# Patient Record
Sex: FEMALE | Race: WHITE | NOT HISPANIC OR LATINO | Employment: FULL TIME | URBAN - METROPOLITAN AREA
[De-identification: names, ages, dates, MRNs, and addresses within clinical notes are randomized per-mention and may not be internally consistent; named-entity substitution may affect disease eponyms.]

---

## 2023-06-19 LAB — ANTE RHIG: NORMAL

## 2023-07-03 ENCOUNTER — CLINICAL SUPPORT (OUTPATIENT)
Dept: INTERNAL MEDICINE | Facility: CLINIC | Age: 33
End: 2023-07-03
Payer: MEDICAID

## 2023-07-03 DIAGNOSIS — Z71.3 DIETARY COUNSELING: Primary | ICD-10-CM

## 2023-07-03 DIAGNOSIS — O24.419 GESTATIONAL DIABETES MELLITUS (GDM) IN THIRD TRIMESTER, GESTATIONAL DIABETES METHOD OF CONTROL UNSPECIFIED: ICD-10-CM

## 2023-07-03 PROCEDURE — 97802 PR MED NUTR THER, 1ST, INDIV, EA 15 MIN: ICD-10-PCS | Mod: 95,,, | Performed by: DIETITIAN, REGISTERED

## 2023-07-03 PROCEDURE — 97802 MEDICAL NUTRITION INDIV IN: CPT | Mod: 95,,, | Performed by: DIETITIAN, REGISTERED

## 2023-07-03 NOTE — PROGRESS NOTES
"The patient location is: [place]  The chief complaint leading to consultation is: nutrition referral    Visit type: audiovisual    Face to Face time with patient: 23 minutes  28 minutes of total time spent on the encounter, which includes face to face time and non-face to face time preparing to see the patient (eg, review of tests), Obtaining and/or reviewing separately obtained history, Documenting clinical information in the electronic or other health record, Independently interpreting results (not separately reported) and communicating results to the patient/family/caregiver, or Care coordination (not separately reported).         Each patient to whom he or she provides medical services by telemedicine is:  (1) informed of the relationship between the physician and patient and the respective role of any other health care provider with respect to management of the patient; and (2) notified that he or she may decline to receive medical services by telemedicine and may withdraw from such care at any time.    Notes:   Nutrition Assessment      Client name:  Falguni Hankins  :  1990  Age:  32 y.o.  Gender:  female    Client states:  referred by Latesha Chase MD with a  diagnosis of:   -Gestational diabetes mellitus (GDM) in third trimester, gestational diabetes method of control unspecified    Currently 32 weeks pregnant with 3rd child (boy). Already have 1 boy and 1 girl.  No prior gestational diabetes diagnosis.     Since diagnosis patient has stopped drinking sodas.    Recent fasting glucose levels: 92, 95, 94  Recent post meal glucose levels: 119, 102, 114. Reports having one reading in the 130s.    Since diagnosis: stopped drinking soda      Anthropometrics  Height:  65"     Weight:  2023: 255 lbs  BMI:  42.43  % Body Fat:  n/a    Clinical Signs/Symptoms  N/V/D:  none noted  Appetite:  good       Past Medical History:   Diagnosis Date    Depression     Hypertension        Past Surgical " History:   Procedure Laterality Date    APPENDECTOMY       SECTION  2012     SECTION  2011       Medications    has a current medication list which includes the following prescription(s): blood sugar diagnostic, blood-glucose meter, bupropion, doxylamine-pyridoxine (vit b6), lancets, nifedipine, ondansetron, promethazine, and sertraline.    Vitamins, Minerals, and/or Supplements:  not discussed     Food/Medication Interactions:  Reviewed     Food Allergies or Intolerances:  NKFA noted in chart     Social History    Marital status:    Employment:  yes    Social History     Tobacco Use    Smoking status: Every Day     Types: Vaping with nicotine    Smokeless tobacco: Never   Substance Use Topics    Alcohol use: Never        Lab Reports   Sodium   Date Value Ref Range Status   2023 136 136 - 145 mmol/L Final     Potassium   Date Value Ref Range Status   2023 3.3 (L) 3.5 - 5.1 mmol/L Final     Chloride   Date Value Ref Range Status   2023 104 95 - 110 mmol/L Final     CO2   Date Value Ref Range Status   2023 23 23 - 29 mmol/L Final     Glucose   Date Value Ref Range Status   2023 113 (H) 70 - 110 mg/dL Final     BUN   Date Value Ref Range Status   2023 7 6 - 20 mg/dL Final     Creatinine   Date Value Ref Range Status   2023 0.6 0.5 - 1.4 mg/dL Final     Calcium   Date Value Ref Range Status   2023 9.3 8.7 - 10.5 mg/dL Final     Anion Gap   Date Value Ref Range Status   2023 9 8 - 16 mmol/L Final      Lab Results   Component Value Date    WBC 9.88 06/15/2023    HGB 12.0 06/15/2023    HCT 36.6 (A) 06/15/2023    MCV 86.5 06/15/2023     06/15/2023        No results found for: CHOL  No results found for: HDL  No results found for: LDLCALC  No results found for: TRIG  No results found for: CHOLHDL  No results found for: LABA1C, HGBA1C  BP Readings from Last 1 Encounters:   23 132/76       Food History  Provided above    Exercise  History:  not discussed    Cultural/Spiritual/Personal Preferences:  None identified    Support System:  family/friends    State of Change:  Preparation    Barriers to Change:  none    Diagnosis    Food and nutrition related knowledge deficit related to no prior nutrition education as evidenced by seeking medical nutrition therapy for gestational diabetes.    Intervention    RMR (Method:  [place] [facility]):  1876 kcal  Activity Factor:  1.3    DEXTER:  2438 kcal    Goals:  1.  When consuming carbohydrates, choose high fiber sources and always pair with a source of protein  2.  Continue to consume sugar free beverages      Nutrition Education  The following education was provided to the patient:  Discussed meal planning/[employer] My Plate design.  Discussed label reading.  Suggested dietary modifications based on current dietary behaviors and individual food preferences.  Discussed gestational diabetes nutrition therapy    Patient verbalized understanding of nutrition education and recommendations received.    Handouts Provided, emailed  Balanced Diabetes Plate  Snack List  Eat Fit Shopping List      Monitoring/Evaluation    Monitor the following:  Weight  BMI  Caloric intake  Labs:  glucose    Follow Up Plan:  as needed